# Patient Record
Sex: FEMALE | Race: BLACK OR AFRICAN AMERICAN | URBAN - METROPOLITAN AREA
[De-identification: names, ages, dates, MRNs, and addresses within clinical notes are randomized per-mention and may not be internally consistent; named-entity substitution may affect disease eponyms.]

---

## 2019-10-31 ENCOUNTER — TELEPHONE (OUTPATIENT)
Dept: FAMILY MEDICINE CLINIC | Age: 54
End: 2019-10-31

## 2019-10-31 ENCOUNTER — OFFICE VISIT (OUTPATIENT)
Dept: FAMILY MEDICINE CLINIC | Age: 54
End: 2019-10-31

## 2019-10-31 VITALS
HEIGHT: 65 IN | RESPIRATION RATE: 18 BRPM | TEMPERATURE: 97.5 F | SYSTOLIC BLOOD PRESSURE: 125 MMHG | BODY MASS INDEX: 39.32 KG/M2 | WEIGHT: 236 LBS | OXYGEN SATURATION: 100 % | HEART RATE: 81 BPM | DIASTOLIC BLOOD PRESSURE: 87 MMHG

## 2019-10-31 DIAGNOSIS — Z96.651 STATUS POST TOTAL RIGHT KNEE REPLACEMENT: ICD-10-CM

## 2019-10-31 DIAGNOSIS — Z90.710 H/O TOTAL HYSTERECTOMY: ICD-10-CM

## 2019-10-31 DIAGNOSIS — Z98.890 S/P RIGHT BREAST BIOPSY: ICD-10-CM

## 2019-10-31 DIAGNOSIS — E66.01 SEVERE OBESITY (HCC): ICD-10-CM

## 2019-10-31 DIAGNOSIS — E78.5 HYPERLIPIDEMIA, UNSPECIFIED HYPERLIPIDEMIA TYPE: ICD-10-CM

## 2019-10-31 DIAGNOSIS — Z98.84 S/P BARIATRIC SURGERY: ICD-10-CM

## 2019-10-31 DIAGNOSIS — Z23 ENCOUNTER FOR IMMUNIZATION: ICD-10-CM

## 2019-10-31 DIAGNOSIS — Z86.39 H/O THYROID CYST: ICD-10-CM

## 2019-10-31 DIAGNOSIS — I10 ESSENTIAL HYPERTENSION: ICD-10-CM

## 2019-10-31 DIAGNOSIS — E11.9 TYPE 2 DIABETES MELLITUS WITHOUT COMPLICATION, WITHOUT LONG-TERM CURRENT USE OF INSULIN (HCC): Primary | ICD-10-CM

## 2019-10-31 DIAGNOSIS — D50.9 IRON DEFICIENCY ANEMIA, UNSPECIFIED IRON DEFICIENCY ANEMIA TYPE: ICD-10-CM

## 2019-10-31 DIAGNOSIS — N60.11 FIBROCYSTIC BREAST DISEASE, RIGHT: ICD-10-CM

## 2019-10-31 DIAGNOSIS — Z11.59 NEED FOR HEPATITIS C SCREENING TEST: ICD-10-CM

## 2019-10-31 DIAGNOSIS — E55.9 VITAMIN D DEFICIENCY: ICD-10-CM

## 2019-10-31 DIAGNOSIS — Z80.3 FH: BREAST CANCER: ICD-10-CM

## 2019-10-31 RX ORDER — TIZANIDINE 4 MG/1
1 TABLET ORAL 3 TIMES DAILY
Refills: 0 | COMMUNITY
Start: 2019-10-16

## 2019-10-31 RX ORDER — METFORMIN HYDROCHLORIDE 500 MG/1
1 TABLET, EXTENDED RELEASE ORAL DAILY
COMMUNITY
Start: 2019-09-20

## 2019-10-31 RX ORDER — SULINDAC 200 MG/1
TABLET ORAL 2 TIMES DAILY
COMMUNITY

## 2019-10-31 RX ORDER — FERROUS SULFATE 325(65) MG
325 TABLET, DELAYED RELEASE (ENTERIC COATED) ORAL
Qty: 90 TAB | Refills: 3 | Status: SHIPPED | OUTPATIENT
Start: 2019-10-31

## 2019-10-31 RX ORDER — METOPROLOL SUCCINATE 50 MG/1
1 TABLET, EXTENDED RELEASE ORAL DAILY
Refills: 1 | COMMUNITY
Start: 2019-10-02

## 2019-10-31 RX ORDER — ATORVASTATIN CALCIUM 10 MG/1
10 TABLET, FILM COATED ORAL DAILY
Refills: 1 | COMMUNITY
Start: 2019-09-20

## 2019-10-31 NOTE — PROGRESS NOTES
After obtaining consent, and per orders of Dr. Geno Bernard injection of flulaval 0.5ml  injected in Left deltoid was given by Mani Gardner LPN. Patient instructed to remain in clinic for 5 minutes afterwards, and to report any adverse reaction to me immediately. Patient verbally understood. Procedure tolerated well. Aseptic technique used.  INJECTION GIVEN AT 10:28AM. LIVE DOCUMENTATION COMPLETED AT 10:38AM

## 2019-10-31 NOTE — PROGRESS NOTES
HISTORY OF PRESENT ILLNESS  Taye Renee is a 47 y.o. female. Patient is here to establish care. She has recently moved down from [place] [id number]. She is due to have a diagnostic mammogram and breast ultrasound  As she has a history of fibrocystic beast disease and has a marker in place. She was not able to complete imaging prior to moving. She has had a hysterectomy , total due to fibroid uterus. There is also a history of thyroid cancer and patient has a history of thyroid cysts which have been drained and have been found to be Benign. She was under the care of endocrinologist. I have discussed referral to endocrinologist. She mentions her last scan was 2 years back. She has a history of diabetes and her blood sugars range within 115. I have discussed ordering some blood work today and I will review this with her over the phone to see if some of her medications can be weaned off as she has been loosing weight and maintaining this. She has been eating better and her bariatric surgery was in 2014. She was not following up with bariatric specialist after her last  yearly check up. Establish Care   The history is provided by the patient. This is a new problem. The problem occurs constantly. The problem has not changed since onset. Pertinent negatives include no chest pain, no abdominal pain, no headaches and no shortness of breath. Follow Up Chronic Condition   The history is provided by the patient. This is a chronic problem. The problem occurs constantly. The problem has been gradually improving. Pertinent negatives include no chest pain, no abdominal pain, no headaches and no shortness of breath. The symptoms are aggravated by stress. The symptoms are relieved by medications. Treatments tried: on meds. Review of Systems   Constitutional: Negative for fever and malaise/fatigue. HENT: Negative for congestion, ear pain, hearing loss, nosebleeds, sinus pain, sore throat and tinnitus.     Eyes: Negative for blurred vision, double vision, photophobia, pain and discharge. Respiratory: Negative for cough, sputum production, shortness of breath and wheezing. Cardiovascular: Negative for chest pain, palpitations and leg swelling. Gastrointestinal: Negative for abdominal pain, blood in stool, constipation, heartburn, nausea and vomiting. Genitourinary: Negative for dysuria, hematuria and urgency. Musculoskeletal: Negative for joint pain and myalgias. Neurological: Negative for dizziness, tingling, focal weakness, weakness and headaches. Endo/Heme/Allergies: Negative for environmental allergies. Psychiatric/Behavioral: Negative for depression. The patient is not nervous/anxious. Visit Vitals  /87   Pulse 81   Temp 97.5 °F (36.4 °C) (Oral)   Resp 18   Ht 5' 5\" (1.651 m)   Wt 236 lb (107 kg)   SpO2 100%   BMI 39.27 kg/m²       Physical Exam   Constitutional: She is oriented to person, place, and time. She appears well-developed and well-nourished. No distress. HENT:   Head: Normocephalic and atraumatic. Right Ear: External ear normal.   Left Ear: External ear normal.   Mouth/Throat: Oropharynx is clear and moist. No oropharyngeal exudate. Eyes: Pupils are equal, round, and reactive to light. EOM are normal. No scleral icterus. Neck: Normal range of motion. No thyromegaly present. Cardiovascular: Normal rate, regular rhythm and normal heart sounds. Pulmonary/Chest: Effort normal and breath sounds normal. No respiratory distress. She has no wheezes. Abdominal: Soft. Bowel sounds are normal. She exhibits no distension. There is no tenderness. Lymphadenopathy:     She has no cervical adenopathy. Neurological: She is alert and oriented to person, place, and time. Psychiatric: She has a normal mood and affect.        ASSESSMENT and PLAN  Diabetes :  1)   Goal HBA1C < 7.  2)   Post prandial blood sugar less than or equal to 180.  3)   Exercise 30 min 3-5 times a week for goal BMI of 25.  4)   Please monitor blood sugars as directed and keep a log to bring in with you at each visit. 5)   Diabetic diet discussed and patient instructions to be handed out. 6)   Goal LDL< 100  7)   Goal BP< 120/80 mmhg. 8)   Annual eye exam and foot exam.  9)   Please examine you feet daily for any skin breakages or ulcers. 10) Referral made to see nutritionist for better dietary guidance. 11) Continue current medications as prescribed. 12) Explained the side effects of medication and patient verbalized understanding. 13) Please avoid smoking , alcohol and illicit drug use if applicable to you.  14) Please have blood work ordered today completed. 15) Please make sure you schedule your routine medical exam every 1-2 years. Hypertension:  1) Goal blood pressure less than equal to 140/90 mmHg, goal BP can vary depending on risk factors as discussed. 2) Lifestyle modifications discussed with patient, low sodium <2 gm, low salt , DASH diet  3) Exercise for at least 30 min 3-5 times a week for goal BMI of less than or equal  To 25.  4) Continue current medications as prescribed. 5) Please begin medication as discussed for better blood pressure control, explained side effects and patient verbalized understanding. 6) Goal LDL<100.  7) Please monitor your blood pressure and keep a log to bring in with you at each visit. 8) Discussed risk factors with patient such as CAD, FAST of stroke symptoms, pt verbalizes understanding. 9) Please avoid smoking , alcohol and any illicit drug use if applicable to you. 10) Discussed lifestyle modifications ,dietary control and BP monitoring at home     Hyperlipidemia :  1) Goal LDL less than or equal to 100 mg/dl , total cholesterol less than or equal to 200 mg/dl. 2) Goal blood pressure less than or equal to 140/90 mmHg. 3) Discussed low - cholesterol and low fat diet, Good Fats vs Bad Fats.   4) Exercise 30 min 3-5 times a week for goal BMI of less than or equal to 25.  5) Continue current medication as prescribed for cholesterol control. 6) Explained the side effects of medication and patient verbalized understanding. 7) You may also begin fish oil 1000 mg 3-4 times a day for better cholesterol control. 8) You may also try red yeast rice for cholesterol control. 9) Please drink skim milk if you drink dairy products. 10) Please avoid smoking , alcohol , or any illicit drug use if applicable to you. 11) Regular cholesterol panel to be done every 6-12 months.  12) Discussed attributing risk factors , patient verbalized understanding. S/p gastric sleeve/ obesity :  - Lifestyle modification is necessary to ensure weight loss and this includes diet , exercise and behavioral modification.  - Diet is geared towards balanced low-calorie diets/portion-controlled diets, low fat diet , low carbohydrate diet and mediterranean diet. Start with 1500 kcal diet.   -Exercise 30 minutes 3-5 times a week   - Behavioral modification helps in controlling eating behavior      Thyroid cysts :  - discussed ordering thyroid blood work   - thyroid ultrasound    Fibrocystic breast disease , right :  - discussed ordering diagnostic mammogram and breast ultrasound

## 2019-11-11 LAB
25(OH)D3+25(OH)D2 SERPL-MCNC: 26.4 NG/ML (ref 30–100)
ALBUMIN SERPL-MCNC: 4.2 G/DL (ref 3.5–5.5)
ALBUMIN/CREAT UR: 6.9 MG/G CREAT (ref 0–30)
ALBUMIN/GLOB SERPL: 1.4 {RATIO} (ref 1.2–2.2)
ALP SERPL-CCNC: 110 IU/L (ref 39–117)
ALT SERPL-CCNC: 15 IU/L (ref 0–32)
AST SERPL-CCNC: 21 IU/L (ref 0–40)
BASOPHILS # BLD AUTO: 0.1 X10E3/UL (ref 0–0.2)
BASOPHILS NFR BLD AUTO: 1 %
BILIRUB SERPL-MCNC: 0.2 MG/DL (ref 0–1.2)
BUN SERPL-MCNC: 16 MG/DL (ref 6–24)
BUN/CREAT SERPL: 19 (ref 9–23)
CALCIUM SERPL-MCNC: 9 MG/DL (ref 8.7–10.2)
CHLORIDE SERPL-SCNC: 101 MMOL/L (ref 96–106)
CHOLEST SERPL-MCNC: 138 MG/DL (ref 100–199)
CO2 SERPL-SCNC: 22 MMOL/L (ref 20–29)
CREAT SERPL-MCNC: 0.84 MG/DL (ref 0.57–1)
CREAT UR-MCNC: 111.6 MG/DL
EOSINOPHIL # BLD AUTO: 0.1 X10E3/UL (ref 0–0.4)
EOSINOPHIL NFR BLD AUTO: 2 %
ERYTHROCYTE [DISTWIDTH] IN BLOOD BY AUTOMATED COUNT: 19.5 % (ref 12.3–15.4)
EST. AVERAGE GLUCOSE BLD GHB EST-MCNC: 137 MG/DL
FOLATE SERPL-MCNC: 19 NG/ML
GLOBULIN SER CALC-MCNC: 3.1 G/DL (ref 1.5–4.5)
GLUCOSE SERPL-MCNC: 76 MG/DL (ref 65–99)
HBA1C MFR BLD: 6.4 % (ref 4.8–5.6)
HCT VFR BLD AUTO: 32 % (ref 34–46.6)
HDLC SERPL-MCNC: 53 MG/DL
HGB BLD-MCNC: 9.9 G/DL (ref 11.1–15.9)
IMM GRANULOCYTES # BLD AUTO: 0 X10E3/UL (ref 0–0.1)
IMM GRANULOCYTES NFR BLD AUTO: 0 %
LDLC SERPL CALC-MCNC: 56 MG/DL (ref 0–99)
LYMPHOCYTES # BLD AUTO: 3.1 X10E3/UL (ref 0.7–3.1)
LYMPHOCYTES NFR BLD AUTO: 49 %
MCH RBC QN AUTO: 22.1 PG (ref 26.6–33)
MCHC RBC AUTO-ENTMCNC: 30.9 G/DL (ref 31.5–35.7)
MCV RBC AUTO: 71 FL (ref 79–97)
MICROALBUMIN UR-MCNC: 7.7 UG/ML
MONOCYTES # BLD AUTO: 0.3 X10E3/UL (ref 0.1–0.9)
MONOCYTES NFR BLD AUTO: 5 %
NEUTROPHILS # BLD AUTO: 2.8 X10E3/UL (ref 1.4–7)
NEUTROPHILS NFR BLD AUTO: 43 %
PLATELET # BLD AUTO: 294 X10E3/UL (ref 150–450)
POTASSIUM SERPL-SCNC: 3.9 MMOL/L (ref 3.5–5.2)
PROT SERPL-MCNC: 7.3 G/DL (ref 6–8.5)
RBC # BLD AUTO: 4.48 X10E6/UL (ref 3.77–5.28)
SODIUM SERPL-SCNC: 141 MMOL/L (ref 134–144)
T4 FREE SERPL-MCNC: 1.14 NG/DL (ref 0.82–1.77)
TRIGL SERPL-MCNC: 145 MG/DL (ref 0–149)
TSH SERPL DL<=0.005 MIU/L-ACNC: 1.9 UIU/ML (ref 0.45–4.5)
VIT B12 SERPL-MCNC: 333 PG/ML (ref 232–1245)
VLDLC SERPL CALC-MCNC: 29 MG/DL (ref 5–40)
WBC # BLD AUTO: 6.4 X10E3/UL (ref 3.4–10.8)

## 2019-11-12 ENCOUNTER — TELEPHONE (OUTPATIENT)
Dept: FAMILY MEDICINE CLINIC | Age: 54
End: 2019-11-12

## 2019-11-12 RX ORDER — ERGOCALCIFEROL 1.25 MG/1
50000 CAPSULE ORAL
Qty: 4 CAP | Refills: 2 | Status: SHIPPED | OUTPATIENT
Start: 2019-11-12 | End: 2020-02-10

## 2019-11-12 NOTE — TELEPHONE ENCOUNTER
Called patient in regards to her blood work and she is aware. Vitamin D has been sent to pharmacy of choice.